# Patient Record
Sex: MALE | Race: BLACK OR AFRICAN AMERICAN
[De-identification: names, ages, dates, MRNs, and addresses within clinical notes are randomized per-mention and may not be internally consistent; named-entity substitution may affect disease eponyms.]

---

## 2017-01-19 ENCOUNTER — HOSPITAL ENCOUNTER (OUTPATIENT)
Dept: HOSPITAL 61 - KCIC | Age: 22
Discharge: HOME | End: 2017-01-19
Attending: FAMILY MEDICINE
Payer: COMMERCIAL

## 2017-01-19 DIAGNOSIS — J18.8: Primary | ICD-10-CM

## 2017-01-19 PROCEDURE — 71020: CPT

## 2017-01-19 NOTE — [facility]
PROCEDURE 

Two-view chest 

 

HISTORY 

Pneumonia of left lung followup 

 

COMPARISON 

None none 

 

FINDINGS 

PA view of the chest and two lateral views of the chest are submitted. 

There is mild opacity along the left heart border although may be due to 

component of epicardial fat. Otherwise no focal infiltrate is identified. 

There is perihilar bronchial wall thickening. There is no dependent 

pleural fluid or pneumothorax. Heart size is considered within normal 

limits. 

 

IMPRESSION 

There is mild opacity along the left heart border although may be due to 

epicardial fat. No other infiltrate is identified. There is perihilar 

bronchial wall thickening. 

 

Electronically signed by: Tremaine Teixeira MD (Jan 19, 2017 16:20:16)

## 2017-04-13 ENCOUNTER — HOSPITAL ENCOUNTER (OUTPATIENT)
Dept: HOSPITAL 61 - KCIC CT | Age: 22
Discharge: HOME | End: 2017-04-13
Attending: FAMILY MEDICINE
Payer: COMMERCIAL

## 2017-04-13 DIAGNOSIS — R05: Primary | ICD-10-CM

## 2017-04-13 PROCEDURE — 71250 CT THORAX DX C-: CPT

## 2017-04-13 NOTE — [facility]
CT thorax without contrast 

Indication: Chronic cough. 

Axial imaging through the chest was performed without contrast. 

--------------------PQRS STATEMENT------------------- 

One or more of the following individualized dose reduction techniques were

utilized for this study: 1.Automated exposure control. 2.Adjustment of the

mA and/orkVaccording to patient size. 3.Use of iterative reconstruction 

technique. 

------------------------------------------------------------------ 

No prior CT studies are available for comparison. 

The right axilla is unremarkable. There is a prominent lymph node in the 

left axilla measuring 3.2 by 1.5 centimeters. Minimal tissue in the 

anterior mediastinum is seen,likely residual thymic tissue. No definite 

hilar or mediastinal lymphadenopathy is identified. No pericardial or 

pleural fluid is identified. The central airways are unremarkable. The 

pulmonary parenchyma is clear. No nodules, infiltrates or masses are 

detected. The upper abdomen is unremarkable. 

Impression: Enlarged left axillary lymph node, indeterminate. Otherwise 

the noncontrast CT chest is unremarkable. 

 

 

Electronically signed by: Tunde Barcenas MD (Apr 13, 2017 10:54:11)

## 2017-05-12 ENCOUNTER — HOSPITAL ENCOUNTER (OUTPATIENT)
Dept: HOSPITAL 61 - KCIC US | Age: 22
Discharge: HOME | End: 2017-05-12
Attending: FAMILY MEDICINE
Payer: COMMERCIAL

## 2017-05-12 DIAGNOSIS — R59.0: Primary | ICD-10-CM

## 2017-05-12 PROCEDURE — 76881 US COMPL JOINT R-T W/IMG: CPT

## 2017-05-12 NOTE — [facility]
PROCEDURE 

Non vascular left extremity ultrasound 

 

HISTORY 

Enlarged left axillary node 

 

COMPARISON 

CT exam April 13, 2017 

 

FINDINGS 

Sonographic images of the left axillary region are submitted. 

Corresponding with the CT findings, there is somewhat enlarged left 

axillary node up to 3.7 x 1.5 x 1.8 centimeters, normal sonographic 

architecture with definition of the central fatty hilum.. 

 

IMPRESSION 

There is nonspecific enlarged left axillary lymph node although preserved 

sonographic architecture. 

 

Electronically signed by: Tremaine Teixeira MD (May 12, 2017 08:39:51)

## 2022-01-19 ENCOUNTER — HOSPITAL ENCOUNTER (EMERGENCY)
Dept: HOSPITAL 61 - ER | Age: 27
Discharge: HOME | End: 2022-01-19
Payer: COMMERCIAL

## 2022-01-19 VITALS — BODY MASS INDEX: 38.25 KG/M2 | HEIGHT: 72 IN | WEIGHT: 282.41 LBS

## 2022-01-19 VITALS — SYSTOLIC BLOOD PRESSURE: 129 MMHG | DIASTOLIC BLOOD PRESSURE: 73 MMHG

## 2022-01-19 DIAGNOSIS — T78.40XA: Primary | ICD-10-CM
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
## 2022-01-19 DIAGNOSIS — R00.0: ICD-10-CM

## 2022-01-19 DIAGNOSIS — Z20.822: ICD-10-CM

## 2022-01-19 LAB
ALBUMIN SERPL-MCNC: 3.7 G/DL (ref 3.4–5)
ALBUMIN/GLOB SERPL: 1 {RATIO} (ref 1–1.7)
ALP SERPL-CCNC: 89 U/L (ref 46–116)
ALT SERPL-CCNC: 43 U/L (ref 16–63)
AMPHETAMINE/METHAMPHETAMINE: (no result)
ANION GAP SERPL CALC-SCNC: 9 MMOL/L (ref 6–14)
AST SERPL-CCNC: 25 U/L (ref 15–37)
BARBITURATES UR-MCNC: (no result) UG/ML
BASOPHILS # BLD AUTO: 0 X10^3/UL (ref 0–0.2)
BASOPHILS NFR BLD: 0 % (ref 0–3)
BENZODIAZ UR-MCNC: (no result) UG/L
BILIRUB SERPL-MCNC: 0.8 MG/DL (ref 0.2–1)
BUN SERPL-MCNC: 19 MG/DL (ref 8–26)
BUN/CREAT SERPL: 16 (ref 6–20)
CALCIUM SERPL-MCNC: 9 MG/DL (ref 8.5–10.1)
CANNABINOIDS UR-MCNC: (no result) UG/L
CHLORIDE SERPL-SCNC: 100 MMOL/L (ref 98–107)
CO2 SERPL-SCNC: 26 MMOL/L (ref 21–32)
COCAINE UR-MCNC: (no result) NG/ML
CREAT SERPL-MCNC: 1.2 MG/DL (ref 0.7–1.3)
EOSINOPHIL NFR BLD: 0 X10^3/UL (ref 0–0.7)
EOSINOPHIL NFR BLD: 1 % (ref 0–3)
ERYTHROCYTE [DISTWIDTH] IN BLOOD BY AUTOMATED COUNT: 12.7 % (ref 11.5–14.5)
GFR SERPLBLD BASED ON 1.73 SQ M-ARVRAT: 87.2 ML/MIN
GLUCOSE SERPL-MCNC: 121 MG/DL (ref 70–99)
HCT VFR BLD CALC: 52.9 % (ref 39–53)
HGB BLD-MCNC: 18.3 G/DL (ref 13–17.5)
LYMPHOCYTES # BLD: 2.9 X10^3/UL (ref 1–4.8)
LYMPHOCYTES NFR BLD AUTO: 43 % (ref 24–48)
MCH RBC QN AUTO: 29 PG (ref 25–35)
MCHC RBC AUTO-ENTMCNC: 35 G/DL (ref 31–37)
MCV RBC AUTO: 82 FL (ref 79–100)
METHADONE SERPL-MCNC: (no result) NG/ML
MONO #: 0.5 X10^3/UL (ref 0–1.1)
MONOCYTES NFR BLD: 8 % (ref 0–9)
NEUT #: 3.4 X10^3/UL (ref 1.8–7.7)
NEUTROPHILS NFR BLD AUTO: 49 % (ref 31–73)
OPIATES UR-MCNC: (no result) NG/ML
PCP SERPL-MCNC: (no result) MG/DL
PLATELET # BLD AUTO: 237 X10^3/UL (ref 140–400)
POTASSIUM SERPL-SCNC: 3.8 MMOL/L (ref 3.5–5.1)
PROT SERPL-MCNC: 7.4 G/DL (ref 6.4–8.2)
RBC # BLD AUTO: 6.42 X10^6/UL (ref 4.3–5.7)
SODIUM SERPL-SCNC: 135 MMOL/L (ref 136–145)
WBC # BLD AUTO: 6.9 X10^3/UL (ref 4–11)

## 2022-01-19 PROCEDURE — 80053 COMPREHEN METABOLIC PANEL: CPT

## 2022-01-19 PROCEDURE — 36415 COLL VENOUS BLD VENIPUNCTURE: CPT

## 2022-01-19 PROCEDURE — 71045 X-RAY EXAM CHEST 1 VIEW: CPT

## 2022-01-19 PROCEDURE — 99284 EMERGENCY DEPT VISIT MOD MDM: CPT

## 2022-01-19 PROCEDURE — 84484 ASSAY OF TROPONIN QUANT: CPT

## 2022-01-19 PROCEDURE — 80307 DRUG TEST PRSMV CHEM ANLYZR: CPT

## 2022-01-19 PROCEDURE — 85025 COMPLETE CBC W/AUTO DIFF WBC: CPT

## 2022-01-19 PROCEDURE — U0003 INFECTIOUS AGENT DETECTION BY NUCLEIC ACID (DNA OR RNA); SEVERE ACUTE RESPIRATORY SYNDROME CORONAVIRUS 2 (SARS-COV-2) (CORONAVIRUS DISEASE [COVID-19]), AMPLIFIED PROBE TECHNIQUE, MAKING USE OF HIGH THROUGHPUT TECHNOLOGIES AS DESCRIBED BY CMS-2020-01-R: HCPCS

## 2022-01-19 PROCEDURE — 87426 SARSCOV CORONAVIRUS AG IA: CPT

## 2022-01-19 PROCEDURE — 96360 HYDRATION IV INFUSION INIT: CPT

## 2022-01-19 NOTE — ED.[facility]
General Adult


EDM:


Chief Complaint:  ALLERGIC REACTION





HPI:


HPI:





Patient is a 26-year-old male who arrives ambulatory to the emergency department

reporting the development of shortness of air as well as rash which began ro

ughly half hour prior to arrival.  Patient took a supplement shortly before this

and states he began noticing that he had some sensation that he was experiencing

some difficulty breathing as well as the development of hives of his chest, 

abdominal wall and extremities.  Patient states he has taken this supplement for

quite some time however this is the only thing he can tie to the development of 

the symptoms.  Patient states he does work out regularly and has been eating 

better in order to increase his fitness.  As result she does go to the gymnasium

and does come in contact with a variety of people however he is not aware of 

anyone who may have been sick.  Patient further states that he is vaccinated.  

He denies any fevers or chest pain.  He further denies any shortness of air 

prior to this development.  He is awake, alert and nontoxic-appearing.





Review of Systems:


Review of Systems:


Constitutional:   Denies fever or chills. []


Eyes:   Denies change in visual acuity. []


HENT:   Denies nasal congestion or sore throat. [] 


Respiratory:   Reports shortness of air.  Denies cough. [] 


Cardiovascular:   Denies chest pain or edema. [] 


GI:   Denies abdominal pain, nausea, vomiting, bloody stools or diarrhea. [] 


:  Denies dysuria. [] 


Musculoskeletal:   Denies back pain or joint pain. [] 


Integument:   Reports rash, itching.  [] 


Neurologic:   Denies headache, focal weakness or sensory changes. [] 


Endocrine:   Denies polyuria or polydipsia. [] 


Lymphatic:  Denies swollen glands. [] 


Psychiatric:  Denies depression or anxiety. []





Current Medications:





Current Medications








 Medications


  (Trade)  Dose


 Ordered  Sig/Barbie  Start Time


 Stop Time Status Last Admin


Dose Admin


 


 Diphenhydramine


 HCl


  (Benadryl Oral


 Elixir)  50 mg  1X  ONCE  22 10:00


 22 10:03 DC 22 10:10


50 MG


 


 Famotidine


  (Pepcid)  40 mg  1X  ONCE  22 10:00


 22 10:03 DC 22 10:12


40 MG


 


 Prednisone


  (Prednisone)  60 mg  1X  ONCE  22 10:00


 22 10:03 DC 22 10:12


60 MG


 


 Sodium Chloride  1,000 ml @ 


 1,000 mls/hr  1X  ONCE  22 10:30


 22 11:29  22 10:38


1,000 MLS/HR











Allergies:


Allergies:





Allergies








Coded Allergies Type Severity Reaction Last Updated Verified


 


  No Known Drug Allergies    22 No











Physical Exam:


PE:





Constitutional: Well developed, well nourished, no acute distress, non-toxic 

appearance. []


HENT: Normocephalic, atraumatic, bilateral external ears normal, oropharynx 

moist, no oral exudates, nose normal. []


Eyes: PERRLA, EOMI, conjunctiva normal, no discharge. [] 


Neck: Normal range of motion, no tenderness, supple, no stridor. [] 


Cardiovascular: Tachycardia.  No murmur []


Lungs & Thorax:  Bilateral breath sounds clear to auscultation []


Abdomen: Bowel sounds normal, soft, no tenderness, no masses, no pulsatile 

masses. [] 


Skin: Patient has an erythematous rash of his chest, abdominal wall as well as 

arms bilaterally.  This rash is raised and does sandrine to touch.  It is dry and 

nontender upon palpation as well.  This rash appears to be consistent with 

urticaria.  There are no petechial or cellulitic features associated with his 

rash.  sh. [] 


Back: No tenderness, no CVA tenderness. [] 


Extremities: No tenderness, no cyanosis, no clubbing, ROM intact, no edema. [] 


Neurologic: Alert and oriented X 3, normal motor function, normal sensory 

function, no focal deficits noted. []


Psychologic: Affect normal, judgement normal, mood normal. []





Current Patient Data:


Labs:





                                Laboratory Tests








Test


 22


10:13 22


10:35


 


SARS-CoV-2 Antigen (Rapid)


 Negative


(NEGATIVE) 





 


White Blood Count


 


 6.9 x10^3/uL


(4.0-11.0)


 


Red Blood Count


 


 6.42 x10^6/uL


(4.30-5.70)  H


 


Hemoglobin


 


 18.3 g/dL


(13.0-17.5)  H


 


Hematocrit


 


 52.9 %


(39.0-53.0)


 


Mean Corpuscular Volume


 


 82 fL ()





 


Mean Corpuscular Hemoglobin  29 pg (25-35)  


 


Mean Corpuscular Hemoglobin


Concent 


 35 g/dL


(31-37)


 


Red Cell Distribution Width


 


 12.7 %


(11.5-14.5)


 


Platelet Count


 


 237 x10^3/uL


(140-400)


 


Neutrophils (%) (Auto)  49 % (31-73)  


 


Lymphocytes (%) (Auto)  43 % (24-48)  


 


Monocytes (%) (Auto)  8 % (0-9)  


 


Eosinophils (%) (Auto)  1 % (0-3)  


 


Basophils (%) (Auto)  0 % (0-3)  


 


Neutrophils # (Auto)


 


 3.4 x10^3/uL


(1.8-7.7)


 


Lymphocytes # (Auto)


 


 2.9 x10^3/uL


(1.0-4.8)


 


Monocytes # (Auto)


 


 0.5 x10^3/uL


(0.0-1.1)


 


Eosinophils # (Auto)


 


 0.0 x10^3/uL


(0.0-0.7)


 


Basophils # (Auto)


 


 0.0 x10^3/uL


(0.0-0.2)


 


Sodium Level


 


 135 mmol/L


(136-145)  L


 


Potassium Level


 


 3.8 mmol/L


(3.5-5.1)


 


Chloride Level


 


 100 mmol/L


()


 


Carbon Dioxide Level


 


 26 mmol/L


(21-32)


 


Anion Gap  9 (6-14)  


 


Blood Urea Nitrogen


 


 19 mg/dL


(8-26)


 


Creatinine


 


 1.2 mg/dL


(0.7-1.3)


 


Estimated GFR


(Cockcroft-Gault) 


 87.2  





 


BUN/Creatinine Ratio  16 (6-20)  


 


Glucose Level


 


 121 mg/dL


(70-99)  H


 


Calcium Level


 


 9.0 mg/dL


(8.5-10.1)


 


Total Bilirubin


 


 0.8 mg/dL


(0.2-1.0)


 


Aspartate Amino Transferase


(AST) 


 25 U/L (15-37)





 


Alanine Aminotransferase (ALT)


 


 43 U/L (16-63)





 


Alkaline Phosphatase


 


 89 U/L


()


 


Troponin I High Sensitivity


 


 < 4 ng/L


(4-75)  L


 


Total Protein


 


 7.4 g/dL


(6.4-8.2)


 


Albumin


 


 3.7 g/dL


(3.4-5.0)


 


Albumin/Globulin Ratio  1.0 (1.0-1.7)  





                                Laboratory Tests


22 10:35








                                Laboratory Tests


22 10:35








Vital Signs:





                                   Vital Signs








  Date Time  Temp Pulse Resp B/P (MAP) Pulse Ox O2 Delivery O2 Flow Rate FiO2


 


22 09:45 98.2  14 138/79 (98)  Room Air  





 98.2       











EKG:


EKG:


[]





Heart Score:


C/O Chest Pain:  No


Risk Factors:


Risk Factors:  DM, Current or recent (<one month) smoker, HTN, HLP, family 

history of CAD, obesity.


Risk Scores:


Score 0 - 3:  2.5% MACE over next 6 weeks - Discharge Home


Score 4 - 6:  20.3% MACE over next 6 weeks - Admit for Clinical Observation


Score 7 - 10:  72.7% MACE over next 6 weeks - Early Invasive Strategies





Radiology/Procedures:


Radiology/Procedures:


[][facility]


                    [address] [place] [address]


                                 [phone]


                                        


                                 IMAGING REPORT





                                     Signed





PATIENT: DIAMOND LAMAS  ACCOUNT: [id number]     MRN#: [id number]


: 1995           LOCATION: ER              AGE: 26


SEX: M                    EXAM DT: 22         ACCESSION#: [id number]


STATUS: REG ER            ORD. PHYSICIAN: ONESIMO SMITH DO


REASON: Tachycardia, shortness of air


PROCEDURE: CHEST AP ONLY





XR CHEST 1V





History: Tachycardia, shortness of air





Comparison: 2017.





Technique: Portable AP radiograph of the chest.





Findings:


The lungs are adequately inflated. No focal airspace consolidation, pleural 

effusion or pneumothorax. Cardiomediastinal silhouette and pulmonary vasculature

 are within normal limits. Osseous structures and soft tissues are unremarkable.





Impression: 


1.  No acute cardiopulmonary process.





Electronically signed by: Artis Kelly MD (2022 10:46 AM) [id number]














DICTATED and SIGNED BY:     ARTIS KELLY MD


DATE:     22 [id number]





Course & Med Decision Making:


Course & Med Decision Making


Pertinent Labs and Imaging studies reviewed. (See chart for details)





The patient remains awake, alert and in no acute distress.  The patient has had 

intermittent bouts of tachycardia with his heart rate reaching as high as 150 

bpm.  Currently the patient's heart rate is 76 bpm.  I did review the 

ingredients from his supplements and I do not see anything that would cause his 

heart rate to be accelerated.  He is very steadfast in his claims that he is not

 utilizing any illicit substances that may contribute to the use of any 

substance that might contribute to tachycardia otherwise.  Moreover I do believe

 the patient as he has no clinical manifestations of any illicit toxidrome 

otherwise.  Patient does admit that he has not been drinking enough water and 

that is very evident as he does appear to have polycythemia.  I advised that he 

drink more water over the next several days.  Should he develop any progression 

of his symptoms or have medical concerns otherwise have advised that he return 

to the emergency department.  The patient has otherwise been encouraged to avoid

 the supplements until he is completely recovered from this event and then 

exercise caution if he decides to utilize the supplements in the future.  The 

patient understands and states he will take these measures under advisement.  He

 is nontoxic-appearing and stable for discharge.





Dragon Disclaimer:


Dragon Disclaimer:


This electronic medical record was generated, in whole or in part, using a voice

 recognition dictation system.





Departure


Departure


Impression:  


   Primary Impression:  


   Allergic reaction


   Additional Impression:  


   Tachycardia


Disposition:   HOME / SELF CARE / HOMELESS


Condition:  STABLE


Referrals:  


TOMMY NOVOA MD (PCP)


Patient Instructions:  Allergies, Generic, Nonspecific Tachycardia





Additional Instructions:  


Patient appears to have had an adverse reaction to an unknown source.  It is 

very possible this may be related to supplements he is taking related to his 

fitness.  Patient does have urticaria which is present.  Despite this he is 

without shortness of air, nausea or a sensation that his throat is closing.  I 

advised that he return to the emergency department should any of the symptoms 

develop.  The patient is otherwise been encouraged to discontinue the symptoms 

and complete the steroid regimen that he has been prescribed and rest.  I have 

also advised the patient to consider following up with his primary care 

physician for allergy testing should this happen again.  Patient understands and

 has agreed to follow these instructions.  He is nontoxic-appearing and stable 

for discharge.  The patient's heart rate at discharge was 80 bpm.


Scripts


Prednisone (PREDNISONE) 50 Mg Tablet


1 TAB PO DAILY for 5 Days, #5 TAB


   Prov: ONESIMO SMITH DO         22





Problem Qualifiers











ONESIMO SMITH DO               2022 10:01

## 2022-01-19 NOTE — RAD
XR CHEST 1V



History: Tachycardia, shortness of air



Comparison: 1/19/2017.



Technique: Portable AP radiograph of the chest.



Findings:

The lungs are adequately inflated. No focal airspace consolidation, pleural effusion or pneumothorax.
 Cardiomediastinal silhouette and pulmonary vasculature are within normal limits. Osseous structures 
and soft tissues are unremarkable.



Impression: 

1.  No acute cardiopulmonary process.



Electronically signed by: Artis Isaacs MD (1/19/2022 10:46 AM) [id number]